# Patient Record
Sex: FEMALE | Race: WHITE | NOT HISPANIC OR LATINO | URBAN - METROPOLITAN AREA
[De-identification: names, ages, dates, MRNs, and addresses within clinical notes are randomized per-mention and may not be internally consistent; named-entity substitution may affect disease eponyms.]

---

## 2024-09-07 ENCOUNTER — APPOINTMENT (OUTPATIENT)
Dept: RADIOLOGY | Facility: HOSPITAL | Age: 84
End: 2024-09-07
Payer: MEDICARE

## 2024-09-07 ENCOUNTER — HOSPITAL ENCOUNTER (OUTPATIENT)
Facility: HOSPITAL | Age: 84
Setting detail: OBSERVATION
End: 2024-09-07
Attending: STUDENT IN AN ORGANIZED HEALTH CARE EDUCATION/TRAINING PROGRAM | Admitting: STUDENT IN AN ORGANIZED HEALTH CARE EDUCATION/TRAINING PROGRAM
Payer: MEDICARE

## 2024-09-07 DIAGNOSIS — K68.3 RETROPERITONEAL HEMATOMA: ICD-10-CM

## 2024-09-07 DIAGNOSIS — K59.00 CONSTIPATION, UNSPECIFIED CONSTIPATION TYPE: ICD-10-CM

## 2024-09-07 DIAGNOSIS — S36.892A TRAUMATIC RETROPERITONEAL HEMATOMA, INITIAL ENCOUNTER: ICD-10-CM
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
## 2024-09-07 DIAGNOSIS — E11.69 TYPE 2 DIABETES MELLITUS WITH OTHER SPECIFIED COMPLICATION, WITHOUT LONG-TERM CURRENT USE OF INSULIN (MULTI): ICD-10-CM

## 2024-09-07 LAB
ABO GROUP (TYPE) IN BLOOD: NORMAL
ALBUMIN SERPL BCP-MCNC: 4.4 G/DL (ref 3.4–5)
ALP SERPL-CCNC: 70 U/L (ref 33–136)
ALT SERPL W P-5'-P-CCNC: 30 U/L (ref 7–45)
ANION GAP SERPL CALC-SCNC: 16 MMOL/L (ref 10–20)
ANTIBODY SCREEN: NORMAL
APPEARANCE UR: CLEAR
APTT PPP: 26 SECONDS (ref 27–38)
AST SERPL W P-5'-P-CCNC: 33 U/L (ref 9–39)
BASOPHILS # BLD AUTO: 0.03 X10*3/UL (ref 0–0.1)
BASOPHILS NFR BLD AUTO: 0.3 %
BILIRUB SERPL-MCNC: 0.5 MG/DL (ref 0–1.2)
BILIRUB UR STRIP.AUTO-MCNC: NEGATIVE MG/DL
BUN SERPL-MCNC: 34 MG/DL (ref 6–23)
CALCIUM SERPL-MCNC: 9.2 MG/DL (ref 8.6–10.3)
CHLORIDE SERPL-SCNC: 102 MMOL/L (ref 98–107)
CO2 SERPL-SCNC: 25 MMOL/L (ref 21–32)
COLOR UR: ABNORMAL
CREAT SERPL-MCNC: 1.31 MG/DL (ref 0.5–1.05)
EGFRCR SERPLBLD CKD-EPI 2021: 40 ML/MIN/1.73M*2
EOSINOPHIL # BLD AUTO: 0.07 X10*3/UL (ref 0–0.4)
EOSINOPHIL NFR BLD AUTO: 0.7 %
ERYTHROCYTE [DISTWIDTH] IN BLOOD BY AUTOMATED COUNT: 13 % (ref 11.5–14.5)
GLUCOSE BLD MANUAL STRIP-MCNC: 174 MG/DL (ref 74–99)
GLUCOSE SERPL-MCNC: 154 MG/DL (ref 74–99)
GLUCOSE UR STRIP.AUTO-MCNC: ABNORMAL MG/DL
HCT VFR BLD AUTO: 43 % (ref 36–46)
HGB BLD-MCNC: 13.9 G/DL (ref 12–16)
IMM GRANULOCYTES # BLD AUTO: 0.04 X10*3/UL (ref 0–0.5)
IMM GRANULOCYTES NFR BLD AUTO: 0.4 % (ref 0–0.9)
INR PPP: 1 (ref 0.9–1.1)
KETONES UR STRIP.AUTO-MCNC: NEGATIVE MG/DL
LEUKOCYTE ESTERASE UR QL STRIP.AUTO: NEGATIVE
LYMPHOCYTES # BLD AUTO: 1.02 X10*3/UL (ref 0.8–3)
LYMPHOCYTES NFR BLD AUTO: 10.9 %
MCH RBC QN AUTO: 30 PG (ref 26–34)
MCHC RBC AUTO-ENTMCNC: 32.3 G/DL (ref 32–36)
MCV RBC AUTO: 93 FL (ref 80–100)
MONOCYTES # BLD AUTO: 0.72 X10*3/UL (ref 0.05–0.8)
MONOCYTES NFR BLD AUTO: 7.7 %
NEUTROPHILS # BLD AUTO: 7.49 X10*3/UL (ref 1.6–5.5)
NEUTROPHILS NFR BLD AUTO: 80 %
NITRITE UR QL STRIP.AUTO: NEGATIVE
NRBC BLD-RTO: 0 /100 WBCS (ref 0–0)
PH UR STRIP.AUTO: 6 [PH]
PLATELET # BLD AUTO: 188 X10*3/UL (ref 150–450)
POTASSIUM SERPL-SCNC: 4.2 MMOL/L (ref 3.5–5.3)
PROT SERPL-MCNC: 7.1 G/DL (ref 6.4–8.2)
PROT UR STRIP.AUTO-MCNC: ABNORMAL MG/DL
PROTHROMBIN TIME: 10.7 SECONDS (ref 9.8–12.8)
RBC # BLD AUTO: 4.64 X10*6/UL (ref 4–5.2)
RBC # UR STRIP.AUTO: NEGATIVE /UL
RBC #/AREA URNS AUTO: NORMAL /HPF
RH FACTOR (ANTIGEN D): NORMAL
SODIUM SERPL-SCNC: 139 MMOL/L (ref 136–145)
SP GR UR STRIP.AUTO: 1.01
UROBILINOGEN UR STRIP.AUTO-MCNC: NORMAL MG/DL
WBC # BLD AUTO: 9.4 X10*3/UL (ref 4.4–11.3)
WBC #/AREA URNS AUTO: NORMAL /HPF

## 2024-09-07 PROCEDURE — G0378 HOSPITAL OBSERVATION PER HR: HCPCS

## 2024-09-07 PROCEDURE — 74177 CT ABD & PELVIS W/CONTRAST: CPT

## 2024-09-07 PROCEDURE — 85025 COMPLETE CBC W/AUTO DIFF WBC: CPT | Performed by: PHYSICIAN ASSISTANT

## 2024-09-07 PROCEDURE — 2550000001 HC RX 255 CONTRASTS: Performed by: PHYSICIAN ASSISTANT

## 2024-09-07 PROCEDURE — 90471 IMMUNIZATION ADMIN: CPT | Performed by: PHYSICIAN ASSISTANT

## 2024-09-07 PROCEDURE — 72131 CT LUMBAR SPINE W/O DYE: CPT

## 2024-09-07 PROCEDURE — 94760 N-INVAS EAR/PLS OXIMETRY 1: CPT

## 2024-09-07 PROCEDURE — 86900 BLOOD TYPING SEROLOGIC ABO: CPT | Performed by: STUDENT IN AN ORGANIZED HEALTH CARE EDUCATION/TRAINING PROGRAM

## 2024-09-07 PROCEDURE — 70486 CT MAXILLOFACIAL W/O DYE: CPT

## 2024-09-07 PROCEDURE — 85730 THROMBOPLASTIN TIME PARTIAL: CPT | Performed by: STUDENT IN AN ORGANIZED HEALTH CARE EDUCATION/TRAINING PROGRAM

## 2024-09-07 PROCEDURE — 76377 3D RENDER W/INTRP POSTPROCES: CPT

## 2024-09-07 PROCEDURE — 72125 CT NECK SPINE W/O DYE: CPT | Performed by: RADIOLOGY

## 2024-09-07 PROCEDURE — 82947 ASSAY GLUCOSE BLOOD QUANT: CPT

## 2024-09-07 PROCEDURE — 36415 COLL VENOUS BLD VENIPUNCTURE: CPT | Performed by: STUDENT IN AN ORGANIZED HEALTH CARE EDUCATION/TRAINING PROGRAM

## 2024-09-07 PROCEDURE — 73130 X-RAY EXAM OF HAND: CPT | Mod: RIGHT SIDE | Performed by: RADIOLOGY

## 2024-09-07 PROCEDURE — 81001 URINALYSIS AUTO W/SCOPE: CPT | Performed by: PHYSICIAN ASSISTANT

## 2024-09-07 PROCEDURE — 76377 3D RENDER W/INTRP POSTPROCES: CPT | Performed by: RADIOLOGY

## 2024-09-07 PROCEDURE — 2500000004 HC RX 250 GENERAL PHARMACY W/ HCPCS (ALT 636 FOR OP/ED): Performed by: PHYSICIAN ASSISTANT

## 2024-09-07 PROCEDURE — 90715 TDAP VACCINE 7 YRS/> IM: CPT | Performed by: PHYSICIAN ASSISTANT

## 2024-09-07 PROCEDURE — 73130 X-RAY EXAM OF HAND: CPT | Mod: RT

## 2024-09-07 PROCEDURE — 80053 COMPREHEN METABOLIC PANEL: CPT | Performed by: PHYSICIAN ASSISTANT

## 2024-09-07 PROCEDURE — 70450 CT HEAD/BRAIN W/O DYE: CPT

## 2024-09-07 PROCEDURE — 72125 CT NECK SPINE W/O DYE: CPT

## 2024-09-07 PROCEDURE — 70450 CT HEAD/BRAIN W/O DYE: CPT | Performed by: RADIOLOGY

## 2024-09-07 PROCEDURE — 73590 X-RAY EXAM OF LOWER LEG: CPT | Mod: RT

## 2024-09-07 PROCEDURE — 99223 1ST HOSP IP/OBS HIGH 75: CPT | Performed by: STUDENT IN AN ORGANIZED HEALTH CARE EDUCATION/TRAINING PROGRAM

## 2024-09-07 PROCEDURE — 85610 PROTHROMBIN TIME: CPT | Performed by: STUDENT IN AN ORGANIZED HEALTH CARE EDUCATION/TRAINING PROGRAM

## 2024-09-07 PROCEDURE — 99285 EMERGENCY DEPT VISIT HI MDM: CPT | Mod: 25

## 2024-09-07 PROCEDURE — 2500000001 HC RX 250 WO HCPCS SELF ADMINISTERED DRUGS (ALT 637 FOR MEDICARE OP): Performed by: STUDENT IN AN ORGANIZED HEALTH CARE EDUCATION/TRAINING PROGRAM

## 2024-09-07 PROCEDURE — 86901 BLOOD TYPING SEROLOGIC RH(D): CPT | Performed by: STUDENT IN AN ORGANIZED HEALTH CARE EDUCATION/TRAINING PROGRAM

## 2024-09-07 PROCEDURE — 70486 CT MAXILLOFACIAL W/O DYE: CPT | Performed by: RADIOLOGY

## 2024-09-07 PROCEDURE — 73590 X-RAY EXAM OF LOWER LEG: CPT | Mod: RIGHT SIDE | Performed by: RADIOLOGY

## 2024-09-07 PROCEDURE — 74177 CT ABD & PELVIS W/CONTRAST: CPT | Performed by: RADIOLOGY

## 2024-09-07 PROCEDURE — 72131 CT LUMBAR SPINE W/O DYE: CPT | Performed by: RADIOLOGY

## 2024-09-07 PROCEDURE — 36415 COLL VENOUS BLD VENIPUNCTURE: CPT | Performed by: PHYSICIAN ASSISTANT

## 2024-09-07 PROCEDURE — 12001 RPR S/N/AX/GEN/TRNK 2.5CM/<: CPT

## 2024-09-07 RX ORDER — DEXTROSE 50 % IN WATER (D50W) INTRAVENOUS SYRINGE
12.5
Status: ACTIVE | OUTPATIENT
Start: 2024-09-07

## 2024-09-07 RX ORDER — OXYCODONE HYDROCHLORIDE 5 MG/1
2.5 TABLET ORAL EVERY 4 HOURS PRN
Status: ACTIVE | OUTPATIENT
Start: 2024-09-07

## 2024-09-07 RX ORDER — DEXTROSE 50 % IN WATER (D50W) INTRAVENOUS SYRINGE
25
Status: ACTIVE | OUTPATIENT
Start: 2024-09-07

## 2024-09-07 RX ORDER — LIDOCAINE HYDROCHLORIDE 10 MG/ML
10 INJECTION INFILTRATION; PERINEURAL ONCE
Status: DISPENSED | OUTPATIENT
Start: 2024-09-07

## 2024-09-07 RX ORDER — AMLODIPINE BESYLATE 5 MG/1
5 TABLET ORAL DAILY
Status: DISPENSED | OUTPATIENT
Start: 2024-09-07

## 2024-09-07 RX ORDER — ACETAMINOPHEN 325 MG/1
975 TABLET ORAL EVERY 8 HOURS
Status: DISPENSED | OUTPATIENT
Start: 2024-09-07

## 2024-09-07 RX ORDER — OXYCODONE HYDROCHLORIDE 5 MG/1
5 TABLET ORAL EVERY 4 HOURS PRN
Status: ACTIVE | OUTPATIENT
Start: 2024-09-07

## 2024-09-07 RX ORDER — AMLODIPINE BESYLATE 2.5 MG/1
2.5 TABLET ORAL
Status: ON HOLD | COMMUNITY
Start: 2024-03-13

## 2024-09-07 RX ORDER — FAMOTIDINE 20 MG/1
10 TABLET, FILM COATED ORAL 2 TIMES DAILY PRN
Status: ACTIVE | OUTPATIENT
Start: 2024-09-07

## 2024-09-07 RX ORDER — PRAVASTATIN SODIUM 40 MG/1
20 TABLET ORAL NIGHTLY
Status: DISPENSED | OUTPATIENT
Start: 2024-09-07

## 2024-09-07 RX ORDER — FERROUS SULFATE 325(65) MG
325 TABLET ORAL NIGHTLY
Status: ON HOLD | COMMUNITY

## 2024-09-07 RX ORDER — METFORMIN HYDROCHLORIDE 500 MG/1
500 TABLET ORAL
Status: ON HOLD | COMMUNITY

## 2024-09-07 RX ORDER — INSULIN LISPRO 100 [IU]/ML
0-10 INJECTION, SOLUTION INTRAVENOUS; SUBCUTANEOUS
Status: DISPENSED | OUTPATIENT
Start: 2024-09-08

## 2024-09-07 RX ORDER — LABETALOL HYDROCHLORIDE 5 MG/ML
20 INJECTION, SOLUTION INTRAVENOUS EVERY 4 HOURS PRN
Status: DISCONTINUED | OUTPATIENT
Start: 2024-09-07 | End: 2024-09-07

## 2024-09-07 RX ORDER — AMOXICILLIN 250 MG
2 CAPSULE ORAL NIGHTLY PRN
Status: ACTIVE | OUTPATIENT
Start: 2024-09-07

## 2024-09-07 RX ORDER — LABETALOL HYDROCHLORIDE 5 MG/ML
10 INJECTION, SOLUTION INTRAVENOUS EVERY 4 HOURS PRN
Status: ACTIVE | OUTPATIENT
Start: 2024-09-07

## 2024-09-07 RX ORDER — PRAVASTATIN SODIUM 20 MG/1
1 TABLET ORAL DAILY
Status: ON HOLD | COMMUNITY
Start: 2017-06-05

## 2024-09-07 RX ORDER — CALCITRIOL 0.25 UG/1
0.25 CAPSULE ORAL DAILY
Status: ON HOLD | COMMUNITY

## 2024-09-07 RX ORDER — ACETAMINOPHEN 500 MG
5 TABLET ORAL NIGHTLY PRN
Status: ACTIVE | OUTPATIENT
Start: 2024-09-07

## 2024-09-07 RX ORDER — ONDANSETRON HYDROCHLORIDE 2 MG/ML
4 INJECTION, SOLUTION INTRAVENOUS EVERY 6 HOURS PRN
Status: ACTIVE | OUTPATIENT
Start: 2024-09-07

## 2024-09-07 SDOH — SOCIAL STABILITY: SOCIAL INSECURITY: HAS ANYONE EVER THREATENED TO HURT YOUR FAMILY OR YOUR PETS?: NO

## 2024-09-07 SDOH — SOCIAL STABILITY: SOCIAL INSECURITY: DO YOU FEEL ANYONE HAS EXPLOITED OR TAKEN ADVANTAGE OF YOU FINANCIALLY OR OF YOUR PERSONAL PROPERTY?: NO

## 2024-09-07 SDOH — SOCIAL STABILITY: SOCIAL INSECURITY: DOES ANYONE TRY TO KEEP YOU FROM HAVING/CONTACTING OTHER FRIENDS OR DOING THINGS OUTSIDE YOUR HOME?: NO

## 2024-09-07 SDOH — SOCIAL STABILITY: SOCIAL INSECURITY: DO YOU FEEL UNSAFE GOING BACK TO THE PLACE WHERE YOU ARE LIVING?: NO

## 2024-09-07 SDOH — SOCIAL STABILITY: SOCIAL INSECURITY: ARE YOU OR HAVE YOU BEEN THREATENED OR ABUSED PHYSICALLY, EMOTIONALLY, OR SEXUALLY BY ANYONE?: NO

## 2024-09-07 SDOH — SOCIAL STABILITY: SOCIAL INSECURITY: HAVE YOU HAD THOUGHTS OF HARMING ANYONE ELSE?: NO

## 2024-09-07 SDOH — SOCIAL STABILITY: SOCIAL INSECURITY: WERE YOU ABLE TO COMPLETE ALL THE BEHAVIORAL HEALTH SCREENINGS?: YES

## 2024-09-07 SDOH — SOCIAL STABILITY: SOCIAL INSECURITY: ARE THERE ANY APPARENT SIGNS OF INJURIES/BEHAVIORS THAT COULD BE RELATED TO ABUSE/NEGLECT?: NO

## 2024-09-07 SDOH — SOCIAL STABILITY: SOCIAL INSECURITY: HAVE YOU HAD ANY THOUGHTS OF HARMING ANYONE ELSE?: NO

## 2024-09-07 SDOH — SOCIAL STABILITY: SOCIAL INSECURITY: ABUSE: ADULT

## 2024-09-07 ASSESSMENT — COGNITIVE AND FUNCTIONAL STATUS - GENERAL
DAILY ACTIVITIY SCORE: 22
DRESSING REGULAR LOWER BODY CLOTHING: A LITTLE
MOBILITY SCORE: 22
WALKING IN HOSPITAL ROOM: A LITTLE
CLIMB 3 TO 5 STEPS WITH RAILING: A LITTLE
DRESSING REGULAR UPPER BODY CLOTHING: A LITTLE
PATIENT BASELINE BEDBOUND: NO

## 2024-09-07 ASSESSMENT — ACTIVITIES OF DAILY LIVING (ADL)
WALKS IN HOME: INDEPENDENT
TOILETING: INDEPENDENT
FEEDING YOURSELF: INDEPENDENT
BATHING: INDEPENDENT
HEARING - LEFT EAR: FUNCTIONAL
HEARING - RIGHT EAR: FUNCTIONAL
PATIENT'S MEMORY ADEQUATE TO SAFELY COMPLETE DAILY ACTIVITIES?: YES
GROOMING: INDEPENDENT
DRESSING YOURSELF: NEEDS ASSISTANCE
JUDGMENT_ADEQUATE_SAFELY_COMPLETE_DAILY_ACTIVITIES: YES
LACK_OF_TRANSPORTATION: NO
ADEQUATE_TO_COMPLETE_ADL: YES

## 2024-09-07 ASSESSMENT — LIFESTYLE VARIABLES
HOW OFTEN DO YOU HAVE A DRINK CONTAINING ALCOHOL: MONTHLY OR LESS
HOW MANY STANDARD DRINKS CONTAINING ALCOHOL DO YOU HAVE ON A TYPICAL DAY: 1 OR 2
TOTAL SCORE: 0
EVER FELT BAD OR GUILTY ABOUT YOUR DRINKING: NO
AUDIT-C TOTAL SCORE: 1
AUDIT-C TOTAL SCORE: 1
HAVE PEOPLE ANNOYED YOU BY CRITICIZING YOUR DRINKING: NO
EVER HAD A DRINK FIRST THING IN THE MORNING TO STEADY YOUR NERVES TO GET RID OF A HANGOVER: NO
HAVE YOU EVER FELT YOU SHOULD CUT DOWN ON YOUR DRINKING: NO
SKIP TO QUESTIONS 9-10: 1
HOW OFTEN DO YOU HAVE 6 OR MORE DRINKS ON ONE OCCASION: NEVER

## 2024-09-07 ASSESSMENT — PAIN DESCRIPTION - PAIN TYPE: TYPE: ACUTE PAIN

## 2024-09-07 ASSESSMENT — PAIN - FUNCTIONAL ASSESSMENT
PAIN_FUNCTIONAL_ASSESSMENT: 0-10

## 2024-09-07 ASSESSMENT — PAIN DESCRIPTION - LOCATION: LOCATION: JAW

## 2024-09-07 ASSESSMENT — PATIENT HEALTH QUESTIONNAIRE - PHQ9
SUM OF ALL RESPONSES TO PHQ9 QUESTIONS 1 & 2: 0
1. LITTLE INTEREST OR PLEASURE IN DOING THINGS: NOT AT ALL
2. FEELING DOWN, DEPRESSED OR HOPELESS: NOT AT ALL

## 2024-09-07 ASSESSMENT — PAIN DESCRIPTION - DESCRIPTORS: DESCRIPTORS: SORE

## 2024-09-07 ASSESSMENT — PAIN SCALES - GENERAL
PAINLEVEL_OUTOF10: 5 - MODERATE PAIN
PAINLEVEL_OUTOF10: 5 - MODERATE PAIN

## 2024-09-07 ASSESSMENT — PAIN DESCRIPTION - ORIENTATION: ORIENTATION: RIGHT

## 2024-09-07 NOTE — ED PROVIDER NOTES
HPI   Chief Complaint   Patient presents with    Fall       History of present illness:  84-year-old female presents to the emergency room for complaints of fall.  Patient states that she was attempting go up some stairs when she lost her balance after missing a step causing a fall onto her right side.  She states that she struck the right side of her face her head and her neck as well as her right lower back.  She states that she also suffered a small injury to her right hand across the palm.  She states that she does not take any blood thinners and takes medications for hypertension and hypothyroidism and diabetes.  She states that she has no other symptoms at this time denies any pain or injury to her legs.  She denies any loss conscious nausea or vomiting or vertigo.      Social history: Negative for alcohol and drug use.    Review of systems:   Gen.: No weight loss, fatigue, anorexia, insomnia, fever.   Eyes: No vision loss, double vision, drainage, eye pain.   ENT: No pharyngitis, dry mouth.   Cardiac: No chest pain, palpitations, syncope, near syncope.   Pulmonary: No shortness of breath, cough, hemoptysis.   Heme/lymph: No swollen glands, fever, bleeding.   GI: No abdominal pain, change in bowel habits, melena, hematemesis, hematochezia, nausea, vomiting, diarrhea.   : No discharge, dysuria, frequency, urgency, hematuria.   Musculoskeletal: No limb pain, joint pain, joint swelling.   Skin: No rashes.   Review of systems is otherwise negative unless stated above or in history of present illness.        Physical exam:  General: Vitals noted, no distress. Afebrile.   EENT: Posterior pharynx unremarkable, there are some bruising present across right side of face but the patient has no pain with movement of the cervical spine no pain to palpation across the scalp or any crepitus appreciated in particular.   Back: There is no pain to palpation over the thoracic spine, there is no direct pain to palpation across  the lumbar spine there is no step-off appreciated but the patient does complain of some pain to palpation across the paraspinal muscles in the lower lumbar region  Cardiac: Regular, rate, rhythm, no murmur.   Pulmonary: Lungs clear bilaterally with good aeration. No adventitious breath sounds.   Abdomen: Soft, nonsurgical. Nontender. No peritoneal signs. Normoactive bowel sounds.   Extremities: No peripheral edema.  2 cm elliptical laceration present just at the base of the fifth digit on the right hand, good cap refill and sensation all fingers at this time, the pain to palpation across the hand, no pain palpation across the legs or across the arms  Skin: No rash.   Neuro: No focal neurologic deficits        Medical decision making:   Testing: CT scan of head cervical spine and facial bones without contrast showed no acute findings, right hand x-rays are also unremarkable for any fracture, CT scan of the lumbar spine shows concerns for possible retroperitoneal hematoma near the right kidney, CT scan of the abdomen pelvis with contrast redemonstrates this but does not show if it is arterial or venous in origin.  There is concern for active bleeding at this time.  All imaging was interpreted by radiology  Treatment: Tetanus shot updated, skin glue used to close the wound on the right hand  Reevaluation: I spoke with Dr. Gusman of general surgery reviewed the case and explained to me that the patient should be admitted for further observation and possible intervention as needed.  I spoke with Dr. Cruz of interventional radiology who also confirmed that he was available for consult if need be by the hospitalist team this weekend.  Plan: 84-year-old female presents to the emergency room for complaints of fall.  Patient states that she was attempting go up some stairs when she lost her balance after missing a step causing a fall onto her right side.  She states that she struck the right side of her face her head and her  neck as well as her right lower back.  She states that she also suffered a small injury to her right hand across the palm.  She states that she does not take any blood thinners and takes medications for hypertension and hypothyroidism and diabetes.  She states that she has no other symptoms at this time denies any pain or injury to her legs.  She denies any loss conscious nausea or vomiting or vertigo. Extremities: No peripheral edema.  2 cm elliptical laceration present just at the base of the fifth digit on the right hand, good cap refill and sensation all fingers at this time, the pain to palpation across the hand, no pain palpation across the legs or across the arms. EENT: Posterior pharynx unremarkable, there are some bruising present across right side of face but the patient has no pain with movement of the cervical spine no pain to palpation across the scalp or any crepitus appreciated in particular.   Back: There is no pain to palpation over the thoracic spine, there is no direct pain to palpation across the lumbar spine there is no step-off appreciated but the patient does complain of some pain to palpation across the paraspinal muscles in the lower lumbar region.  I explained to the patient the test results at this time and she was admitted to the care of the hospitalist team for further evaluation.  Impression:   1.  Peritoneal hematoma  2.  Head injury  3. fall          History provided by:  Patient   used: No            Patient History   Past Medical History:   Diagnosis Date    Personal history of other venous thrombosis and embolism     H/O blood clots     Past Surgical History:   Procedure Laterality Date    EYE SURGERY  03/03/2016    Eye Surgery    KNEE SURGERY  03/03/2016    Knee Surgery    OTHER SURGICAL HISTORY  03/03/2016    Biopsy Skin     No family history on file.  Social History     Tobacco Use    Smoking status: Not on file    Smokeless tobacco: Not on file   Substance  Use Topics    Alcohol use: Not on file    Drug use: Not on file       Physical Exam   ED Triage Vitals [09/07/24 1352]   Temperature Heart Rate Respirations BP   36.6 °C (97.9 °F) 97 14 (!) 182/110      Pulse Ox Temp Source Heart Rate Source Patient Position   98 % Temporal Monitor Sitting      BP Location FiO2 (%)     Left arm --       Physical Exam      ED Course & MDM   ED Course as of 09/07/24 1852   Sat Sep 07, 2024   1810 Xr leg 1. No fracture or dislocation.  2. Degenerative changes,    [WL]   1811 Xr hand 1. No fracture or dislocation.  2. Degenerative changes   [WL]   1811 CT head wo IV contrast  No acute intracranial hemorrhage or mass-effect. [WL]   1812 CT cervical spine wo IV contrast  No cervical vertebral compression deformity or acute displaced  fracture. Grade 1 anterolisthesis at C3-4. Multilevel  productive/degenerative changes.   [WL]   1812 Ct face No acute displaced facial bone fracture visualized. Mild soft tissue  swelling inferior to the right mandible.   [WL]   1813 CT lumbar spine wo IV contrast  No lumbar vertebral compression deformity or acute displaced  fracture. Multilevel degenerative changes with scoliosis and  spondylolisthesis.      Partially imaged right retroperitoneal hematoma displacing the right  kidney. Consider more complete evaluation using CT abdomen and pelvis.   [WL]   1813 This is a 84-year-old female present with chief complaint of fall.  Fall reportedly mechanical.  Imaging performed.  Demonstrate recurrent looks like concerning hematoma of the right kidney. .  Plan will be to consult surgery and admission. [WL]   1821 Did reach out to Dr. Gusman for general surgery.  Stated would be okay to admit here to medicine as long as if need be IR on as it is more so medical management then surgical intervention. [WL]   1829 Ct abd shows 1. Right retroperitoneal hematoma centered posteromedial to the right  kidney. There is lateral displacement of the right kidney. There is  a  small focus of active extravasation within the superior aspect of the  hematoma. The source vessel for the focus of active extravasation is  unclear, possibly from a lumbar or renal vessel. It is also unclear  if this is arterial or venous in origin.  2. The source of the hematoma is unclear, however it appears to be  distinct from the renal parenchyma and there is no evidence of renal  parenchymal injury. Hematoma may be arising from a retroperitoneal  vessel or possibly from the right psoas muscle with extension beyond  the confines of the muscle. However, there is no abnormal expansion  of the right psoas muscle more inferiorly.  3. Please see additional findings and discussion as above.   [WL]   1850 Slight laceration to right hand.  Full range of motion in the right hand neuro vastly intact distally.  No pulsatile bleeding.  Tender to palpation to right flank no bruising or decreased range of motion. [WL]   1850 IR consulted and okay to admit here to medicine.  Medicine agrees on admission. [WL]      ED Course User Index  [WL] Chai Tamez DO         Diagnoses as of 09/07/24 1852   Fall, initial encounter   Traumatic retroperitoneal hematoma, initial encounter                 No data recorded     Sumeet Coma Scale Score: 15 (09/07/24 1357 : Karine Arita RN)                           Medical Decision Making      Procedure  Procedures     Geovany Peace PA-C  09/07/24 8598

## 2024-09-07 NOTE — ED TRIAGE NOTES
Patient was walking on sidewalk, tripped and fell onto her right side. Stated she scraped her head on cement of sidewalk. No LOC, no blood thinners. Laceration right hand, no active bleeding

## 2024-09-07 NOTE — H&P
[facility]  Department of Hospital Medicine    HISTORY AND PHYSICAL    Chief Complaint   Patient presents with    Fall        History Of Present Illness  Claudette Whitelaw is a 84 y.o. female with type 2 diabetes, hyperlipidemia, hypertension, CKD 3A.  She presented to the [facility] ED after she tripped and fell on a sidewalk, sustaining a laceration to her right hand.  Also had back pain.  Workup in the ED revealed a right sided retroperitoneal hematoma with active extravasation.  Not on blood thinners.  Case was discussed with on-call trauma and IR, who recommended observation but no interventions at this time.    Right hand laceration glued in the ED.  Endorses taking Tylenol and ibuprofen for pain.  Per outpatient notes, is prescribed 2.5 mg of amlodipine but not on the fill history.  Blood pressure was elevated to 180 systolic.  Reports that she usually better controlled at home.  Having pain in the right lower back.     Past Medical History  She has a past medical history of Personal history of other venous thrombosis and embolism.    Surgical History  She has a past surgical history that includes Knee surgery (03/03/2016); Eye surgery (03/03/2016); and Other surgical history (03/03/2016).     Social History  She has no history on file for tobacco use, alcohol use, and drug use.    Family History  No family history on file.     Allergies  Clindamycin    Review of systems  11-point ROS was performed and is negative except as noted in the HPI.     Physical Exam   CON: awake, alert, moderate distress;   EYES: conjunctiva wnl; PERRL; EOMI  ENMT: hearing intact; MMM;   NECK: symmetric; thyroid and cervical nodes wnl;   CV: S1 S2 - RRR with no m/g/r; no lower extremity edema; normal JVP; symmetric pulses  RESP: normal work of breathing; lungs CTAB;   GI: abdomen nontender; no organomegaly;   SKIN: no lesions or rashes; no induration;   MSK: ROM wnl; digits wnl; right hand  "superficial laceration at base of right thumb  NEURO: language and speech wnl; sensation and motor function grossly intact   PSYCH: oriented to situation; affect anxious;     Last Recorded Vitals  Blood pressure (!) 184/96, pulse 95, temperature 36.6 °C (97.9 °F), temperature source Temporal, resp. rate 14, height 1.473 m (4' 10\"), weight 59 kg (130 lb), SpO2 95%.    Relevant Results  Lab Results   Component Value Date    WBC 9.4 09/07/2024    HGB 13.9 09/07/2024    HCT 43.0 09/07/2024    MCV 93 09/07/2024     09/07/2024      Lab Results   Component Value Date    GLUCOSE 154 (H) 09/07/2024    CALCIUM 9.2 09/07/2024     09/07/2024    K 4.2 09/07/2024    CO2 25 09/07/2024     09/07/2024    BUN 34 (H) 09/07/2024    CREATININE 1.31 (H) 09/07/2024        Scheduled medications:  acetaminophen, 975 mg, oral, q8h  [START ON 9/8/2024] insulin lispro, 0-10 Units, subcutaneous, TID  lidocaine, 10 mL, subcutaneous, Once      Continuous medications:     PRN medications:  PRN medications: dextrose, dextrose, famotidine, glucagon, glucagon, labetaloL, melatonin, ondansetron, oxyCODONE, oxyCODONE, oxygen, sennosides-docusate sodium                Assessment/Plan   Principal Problem:    Retroperitoneal hematoma    Claudette Whitelaw is a 84 y.o. female with type 2 diabetes, hyperlipidemia, hypertension, CKD 3A.  She presented to the [facility] ED after she tripped and fell on a sidewalk, sustaining a laceration to her right hand.  Also had back pain.  Workup in the ED revealed a right sided retroperitoneal hematoma with active extravasation.  Not on blood thinners.  Case was discussed with on-call trauma and IR, who recommended observation but no interventions at this time.    Right retroperitoneal hematoma with active extravasation:  > Small hematoma on imaging, unclear if arterial or venous source.  Appears to be well-contained.  Anticipate it will tamponade on its own.  -Pain control, avoiding NSAIDs  -Avoid " pharmacologic DVT prophylaxis  -Trend CBC  -Blood pressure control  -Per IR, no need for follow-up imaging unless she decompensates    Acute on chronic hypertension:  > Likely worsened in the setting of pain  -Resume amlodipine  -Labetalol as needed     DM2:  -trend fingerstick glucose; inpatient goal 140-180  -holding oral hypoglycemics  -sliding scale insulin  -hypoglycemia protocol    Dispo: observation, likely 1 day          Yuriy Costa MD    Patient Name:  Claudette Whitelaw   MRN:   [id number]   [facility]:  [facility]

## 2024-09-08 VITALS
OXYGEN SATURATION: 94 % | SYSTOLIC BLOOD PRESSURE: 129 MMHG | BODY MASS INDEX: 27.3 KG/M2 | HEART RATE: 94 BPM | TEMPERATURE: 97.5 F | WEIGHT: 130.07 LBS | DIASTOLIC BLOOD PRESSURE: 78 MMHG | RESPIRATION RATE: 17 BRPM | HEIGHT: 58 IN

## 2024-09-08 LAB
ANION GAP SERPL CALC-SCNC: 13 MMOL/L (ref 10–20)
BASOPHILS # BLD AUTO: 0.04 X10*3/UL (ref 0–0.1)
BASOPHILS NFR BLD AUTO: 0.5 %
BUN SERPL-MCNC: 35 MG/DL (ref 6–23)
CALCIUM SERPL-MCNC: 8.9 MG/DL (ref 8.6–10.3)
CHLORIDE SERPL-SCNC: 102 MMOL/L (ref 98–107)
CO2 SERPL-SCNC: 24 MMOL/L (ref 21–32)
CREAT SERPL-MCNC: 1.42 MG/DL (ref 0.5–1.05)
EGFRCR SERPLBLD CKD-EPI 2021: 36 ML/MIN/1.73M*2
EOSINOPHIL # BLD AUTO: 0.13 X10*3/UL (ref 0–0.4)
EOSINOPHIL NFR BLD AUTO: 1.6 %
ERYTHROCYTE [DISTWIDTH] IN BLOOD BY AUTOMATED COUNT: 13.1 % (ref 11.5–14.5)
GLUCOSE BLD MANUAL STRIP-MCNC: 108 MG/DL (ref 74–99)
GLUCOSE BLD MANUAL STRIP-MCNC: 109 MG/DL (ref 74–99)
GLUCOSE BLD MANUAL STRIP-MCNC: 168 MG/DL (ref 74–99)
GLUCOSE BLD MANUAL STRIP-MCNC: 96 MG/DL (ref 74–99)
GLUCOSE SERPL-MCNC: 110 MG/DL (ref 74–99)
HCT VFR BLD AUTO: 36.2 % (ref 36–46)
HCT VFR BLD AUTO: 38.1 % (ref 36–46)
HGB BLD-MCNC: 11.5 G/DL (ref 12–16)
HGB BLD-MCNC: 12.1 G/DL (ref 12–16)
IMM GRANULOCYTES # BLD AUTO: 0.05 X10*3/UL (ref 0–0.5)
IMM GRANULOCYTES NFR BLD AUTO: 0.6 % (ref 0–0.9)
LYMPHOCYTES # BLD AUTO: 1.29 X10*3/UL (ref 0.8–3)
LYMPHOCYTES NFR BLD AUTO: 15.4 %
MCH RBC QN AUTO: 30 PG (ref 26–34)
MCHC RBC AUTO-ENTMCNC: 31.8 G/DL (ref 32–36)
MCV RBC AUTO: 94 FL (ref 80–100)
MONOCYTES # BLD AUTO: 0.9 X10*3/UL (ref 0.05–0.8)
MONOCYTES NFR BLD AUTO: 10.7 %
NEUTROPHILS # BLD AUTO: 5.97 X10*3/UL (ref 1.6–5.5)
NEUTROPHILS NFR BLD AUTO: 71.2 %
NRBC BLD-RTO: 0 /100 WBCS (ref 0–0)
PLATELET # BLD AUTO: 195 X10*3/UL (ref 150–450)
POTASSIUM SERPL-SCNC: 4 MMOL/L (ref 3.5–5.3)
RBC # BLD AUTO: 4.04 X10*6/UL (ref 4–5.2)
SODIUM SERPL-SCNC: 135 MMOL/L (ref 136–145)
WBC # BLD AUTO: 8.4 X10*3/UL (ref 4.4–11.3)

## 2024-09-08 PROCEDURE — 36415 COLL VENOUS BLD VENIPUNCTURE: CPT | Performed by: INTERNAL MEDICINE

## 2024-09-08 PROCEDURE — 85025 COMPLETE CBC W/AUTO DIFF WBC: CPT | Performed by: INTERNAL MEDICINE

## 2024-09-08 PROCEDURE — 85014 HEMATOCRIT: CPT | Performed by: INTERNAL MEDICINE

## 2024-09-08 PROCEDURE — 97116 GAIT TRAINING THERAPY: CPT | Mod: GP

## 2024-09-08 PROCEDURE — 2500000005 HC RX 250 GENERAL PHARMACY W/O HCPCS: Performed by: STUDENT IN AN ORGANIZED HEALTH CARE EDUCATION/TRAINING PROGRAM

## 2024-09-08 PROCEDURE — 97161 PT EVAL LOW COMPLEX 20 MIN: CPT | Mod: GP

## 2024-09-08 PROCEDURE — 82947 ASSAY GLUCOSE BLOOD QUANT: CPT

## 2024-09-08 PROCEDURE — 94760 N-INVAS EAR/PLS OXIMETRY 1: CPT

## 2024-09-08 PROCEDURE — G0378 HOSPITAL OBSERVATION PER HR: HCPCS

## 2024-09-08 PROCEDURE — 80048 BASIC METABOLIC PNL TOTAL CA: CPT | Performed by: INTERNAL MEDICINE

## 2024-09-08 PROCEDURE — 2500000001 HC RX 250 WO HCPCS SELF ADMINISTERED DRUGS (ALT 637 FOR MEDICARE OP): Performed by: STUDENT IN AN ORGANIZED HEALTH CARE EDUCATION/TRAINING PROGRAM

## 2024-09-08 PROCEDURE — 99232 SBSQ HOSP IP/OBS MODERATE 35: CPT | Performed by: INTERNAL MEDICINE

## 2024-09-08 PROCEDURE — 99222 1ST HOSP IP/OBS MODERATE 55: CPT | Performed by: SURGERY

## 2024-09-08 RX ORDER — CALCITRIOL 0.25 UG/1
0.25 CAPSULE ORAL DAILY
OUTPATIENT
Start: 2024-09-08

## 2024-09-08 ASSESSMENT — COGNITIVE AND FUNCTIONAL STATUS - GENERAL
WALKING IN HOSPITAL ROOM: A LITTLE
DAILY ACTIVITIY SCORE: 21
CLIMB 3 TO 5 STEPS WITH RAILING: A LITTLE
WALKING IN HOSPITAL ROOM: A LITTLE
MOBILITY SCORE: 22
WALKING IN HOSPITAL ROOM: A LITTLE
MOBILITY SCORE: 22
DRESSING REGULAR UPPER BODY CLOTHING: A LITTLE
EATING MEALS: A LITTLE
DRESSING REGULAR LOWER BODY CLOTHING: A LITTLE
DAILY ACTIVITIY SCORE: 22
DRESSING REGULAR LOWER BODY CLOTHING: A LITTLE
MOBILITY SCORE: 22
CLIMB 3 TO 5 STEPS WITH RAILING: A LITTLE
DRESSING REGULAR UPPER BODY CLOTHING: A LITTLE
CLIMB 3 TO 5 STEPS WITH RAILING: A LITTLE

## 2024-09-08 ASSESSMENT — PAIN SCALES - GENERAL
PAINLEVEL_OUTOF10: 2
PAINLEVEL_OUTOF10: 0 - NO PAIN
PAINLEVEL_OUTOF10: 0 - NO PAIN

## 2024-09-08 ASSESSMENT — ACTIVITIES OF DAILY LIVING (ADL): LACK_OF_TRANSPORTATION: NO

## 2024-09-08 ASSESSMENT — PAIN - FUNCTIONAL ASSESSMENT: PAIN_FUNCTIONAL_ASSESSMENT: 0-10

## 2024-09-08 NOTE — CARE PLAN
The patient's goals for the shift include  to find out if she can go home soon.    The clinical goals for the shift include stable vitals      Problem: Pain - Adult  Goal: Verbalizes/displays adequate comfort level or baseline comfort level  Reactivated     Problem: Safety - Adult  Goal: Free from fall injury  Reactivated     Problem: Fall/Injury  Goal: Not fall by end of shift  Reactivated     Problem: Pain  Goal: Turns in bed with improved pain control throughout the shift  Reactivated  Goal: Walks with improved pain control throughout the shift  Reactivated     Pt had uneventful shift.  Vitals stable, pain controlled with scheduled tylenol. Pt slept and remained safe.

## 2024-09-08 NOTE — CARE PLAN
The patient's goals for the shift include  no pain     The clinical goals for the shift include no pain

## 2024-09-08 NOTE — CONSULTS
Reason For Consult  Retroperitoneal hematoma    History Of Present Illness  Claudette Whitelaw is a 84 y.o. female presenting with retroperitoneal hematoma after a fall.  The patient states that she was.  Attempting to go up some steps when she missed a step and fell this was on brick.  She hit the side of her face mostly in her right lower back she feels that she may have done a lot of twisting and turning when she went down.  She also had a small soft tissue injury to her right hand.  She denies any loss of consciousness and she denies any pain at this time.  She has a little soreness over her right leg around the knee where there is a small hematoma.  She does not take any blood thinners.     Past Medical History  She has a past medical history of Personal history of other venous thrombosis and embolism.  Patient has a history of hypertension hypothyroidism and diabetes.    Surgical History  She has a past surgical history that includes Knee surgery (03/03/2016); Eye surgery (03/03/2016); and Other surgical history (03/03/2016).     Social History  She has no history on file for tobacco use, alcohol use, and drug use.  She does not smoke she does not drink alcohol  Family History  No family history on file.  Noncontributory  Allergies  Clindamycin    Review of Systems  10 point review is otherwise negative     Physical Exam  Head is normocephalic atraumatic eyes extraocular movements are intact intact the pupils are equal.  Neck is supple and nontender.  She has full range of motion.  Trachea is midline.  The upper extremities reveal some bruising and a Band-Aid over the lateral aspect of her right hand.  She is neurologically intact in both the upper and lower extremities.  Heart is regular rate and rhythm.  Lungs are clear bilaterally.  Abdomen is flat and soft and completely nontender.  The lower extremities reveal lots of chronic venous changes and also a bruise and hematoma over the lateral aspect of the  "right lower extremity around the knee.  This is soft however and not tense.     Last Recorded Vitals  Blood pressure 128/81, pulse 73, temperature 36.1 °C (97 °F), temperature source Temporal, resp. rate 18, height 1.473 m (4' 10\"), weight 59 kg (130 lb 1.1 oz), SpO2 96%.    Relevant Results  Hemoglobin is normal currently  Right tib-fib films are normal, x-ray of the right hand does not show any fracture, CT scan of the head does not show any acute injury, CT scan of the facial bones does not show any fracture, CT scan of the cervical spine does not show any acute injury just chronic changes, CT scan of the lumbar spine does not show any acute fracture just degenerative changes that are chronic.  CT scan of the abdomen and pelvis shows a right-sided retroperitoneal hematoma that is posterior medial to the right kidney.  There is a small amount of extravasation seen on this CAT scan.       Assessment/Plan     History of a fall just yesterday and a retroperitoneal hematoma.  The patient clinically is very stable agree with continued observation and monitoring of H&H.  Suspect that her H&H will drop given the size of the hematoma on the CT scan.  Equilibration may take a few days.  No need for additional imaging unless she clinically deteriorates or H&H precipitously drops.    I spent 30 minutes in the professional and overall care of this patient.      Padmini Gusman MD    "

## 2024-09-08 NOTE — PROGRESS NOTES
09/08/24 0742   Discharge Planning   Living Arrangements Spouse/significant other   Support Systems Spouse/significant other   Assistance Needed A&OX3; independent with ADLs with no DME currently; drives; room air baseline and currently room air   Type of Residence Private residence   Number of Stairs to Enter Residence 2   Number of Stairs Within Residence 26   Do you have animals or pets at home? No   Who is requesting discharge planning? Provider   Home or Post Acute Services None   Expected Discharge Disposition Home  (Pt very independent and not home bound. Dc dispo pending workup but likely home no needs)   Does the patient need discharge transport arranged? No   Financial Resource Strain   How hard is it for you to pay for the very basics like food, housing, medical care, and heating? Not hard   Housing Stability   In the last 12 months, was there a time when you were not able to pay the mortgage or rent on time? N   In the past 12 months, how many times have you moved where you were living? 1   At any time in the past 12 months, were you homeless or living in a shelter (including now)? N   Transportation Needs   In the past 12 months, has lack of transportation kept you from medical appointments or from getting medications? no   In the past 12 months, has lack of transportation kept you from meetings, work, or from getting things needed for daily living? No

## 2024-09-08 NOTE — PROGRESS NOTES
Physical Therapy    Physical Therapy Evaluation & Treatment    Patient Name: Claudette Whitelaw  MRN: [id number]  Today's Date: 9/8/2024   Time Calculation  Start Time: 1141  Stop Time: 1206  Time Calculation (min): 25 min    Assessment/Plan   PT Assessment  Rehab Prognosis: Good  Evaluation/Treatment Tolerance: Patient tolerated treatment well  Medical Staff Made Aware: Yes  Assessment Comment: Pt is an 83yo presenting after a fall in the community. Balance impairment with gait noted and pt encouraged to use a cane for mobiity to reduce risk for falls. Recommend LOW intensity skilled PT services.  End of Session Patient Position: Up in chair, Alarm off, not on at start of session   IP OR SWING BED PT PLAN  Inpatient or Swing Bed: Inpatient  PT Plan  PT Plan: Ongoing PT  PT Frequency: 2 times per week  PT Discharge Recommendations: Low intensity level of continued care  Equipment Recommended upon Discharge: Straight cane  PT - OK to Discharge: Yes (Per PT POC)      Subjective     General Visit Information:  General  Reason for Referral: fall in community  Past Medical History Relevant to Rehab: PMH:2 diabetes, hyperlipidemia, hypertension, CKD 3A  Prior to Session Communication: Bedside nurse  Patient Position Received: Bed, 3 rail up  Home Living:  Home Living  Type of Home: House  Lives With: Spouse  Home Adaptive Equipment: Cane, Walker rolling or standard  Home Layout: Two level  Home Access: Stairs to enter with rails  Entrance Stairs-Rails: Right  Entrance Stairs-Number of Steps: 1  Bathroom Shower/Tub: Walk-in shower (with grab bar and seat)  Home Living Comments:  (2nd floor bed/bath; 1/2 bath on first floor)  Prior Level of Function:  Prior Function Per Pt/Caregiver Report  Level of [place]: Independent with ADLs and functional transfers  Prior Function Comments:  ((+) driving; reports several falls)  Precautions:  Precautions  Medical Precautions: Fall precautions    Vital Signs (Past 2hrs)                 Objective   Pain:  Pain Assessment  0-10 (Numeric) Pain Score: 0 - No pain  Cognition:  Cognition  Attention:  (easily distractable)    General Assessments:                  Activity Tolerance  Endurance: Endurance does not limit participation in activity    Strength  Strength Comments: BLE WFL  Static Sitting Balance  Static Sitting-Comment/Number of Minutes: good/independent  Dynamic Sitting Balance  Dynamic Sitting-Comments: good/independent    Dynamic Standing Balance  Dynamic Standing-Comments: Lateral sway observed with dynamic activities. Recommend AD for additional support to reduce risk for further falls.  Functional Assessments:  Bed Mobility  Bed Mobility: Yes  Bed Mobility 1  Bed Mobility 1: Supine to sitting    Transfers  Transfer: Yes  Transfer 1  Transfer From 1: Sit to, Stand to  Transfer to 1: Sit, Stand  Technique 1: Sit to stand, Stand to sit    Ambulation/Gait Training  Ambulation/Gait Training Performed: Yes  Ambulation/Gait Training 1  Surface 1: Level tile  Device 1: No device  Assistance 1: Contact guard  Quality of Gait 1:  (lateral path deviation)  Comments/Distance (ft) 1: 200ft  Extremity/Trunk Assessments:  RLE   RLE : Within Functional Limits  LLE   LLE : Within Functional Limits  Treatments:  Ambulation/Gait Training  Ambulation/Gait Training Performed: Yes  Ambulation/Gait Training 1  Surface 1: Level tile  Device 1: No device  Assistance 1: Contact guard  Quality of Gait 1:  (lateral path deviation)  Comments/Distance (ft) 1: 200ft. Cues to increase attention to task to improve safety, however pt demonstrated difficulty with attaining. Recommend use of cane for mobility.  Outcome Measures:  [facility] Basic Mobility  Turning from your back to your side while in a flat bed without using bedrails: None  Moving from lying on your back to sitting on the side of a flat bed without using bedrails: None  Moving to and from bed to chair (including a wheelchair): None  Standing up from a chair  using your arms (e.g. wheelchair or bedside chair): None  To walk in hospital room: A little  Climbing 3-5 steps with railing: A little  Basic Mobility - Total Score: 22    Encounter Problems       Encounter Problems (Active)       Balance       STG - Maintains dynamic standing balance without upper extremity support x5min       Start:  09/08/24    Expected End:  09/22/24       INTERVENTIONS:  1. Practice standing with minimal support.  2. Educate patient about standing tolerance.  3. Educate patient about independence with gait, transfers, and ADL's.  4. Educate patient about use of assistive device.  5. Educate patient about self-directed care.            Mobility       STG - Patient will ambulate 250ft with cane without LOB       Start:  09/08/24    Expected End:  09/22/24            Pt will tolerate 20reps of standing exercises to improve functional strength and balance       Start:  09/08/24    Expected End:  09/22/24               Pain - Adult              Education Documentation  Precautions, taught by Shirlene Cross, PT at 9/8/2024  1:11 PM.  Learner: Patient  Readiness: Acceptance  Method: Explanation  Response: Verbalizes Understanding    Mobility Training, taught by Shirlene Cross, PT at 9/8/2024  1:11 PM.  Learner: Patient  Readiness: Acceptance  Method: Explanation  Response: Verbalizes Understanding    Education Comments  No comments found.

## 2024-09-08 NOTE — PROGRESS NOTES
Patient: Claudette Whitelaw  Room/bed: 220/220-A  Admitted on: 9/7/2024    Age: 84 y.o.   Gender: female  Code Status:  No Order   Admitting Dx: Fall, initial encounter [W19.XXXA]  Traumatic retroperitoneal hematoma, initial encounter [S36.892A]  Retroperitoneal hematoma [K68.3]    MRN: [id number]  PCP: Brenda Harris MD       Subjective   Feels a little better than yesterday.     Objective    Physical Exam  Constitutional:       Appearance: Normal appearance.   HENT:      Head: Normocephalic.      Comments: Tender subcutaneous edema on right mandible, jaw area.      Nose: Nose normal.      Mouth/Throat:      Mouth: Mucous membranes are moist.   Eyes:      Extraocular Movements: Extraocular movements intact.      Pupils: Pupils are equal, round, and reactive to light.   Cardiovascular:      Rate and Rhythm: Normal rate and regular rhythm.      Pulses: Normal pulses.   Pulmonary:      Effort: Pulmonary effort is normal.      Breath sounds: Normal breath sounds.   Abdominal:      Comments: Soft. Decreased but present bowel sounds.   Mild discomfort on right side to palpation.    Musculoskeletal:      Comments: Scattered ecchymoses on lower extremities  Tender over lower right paravertebral muscles.  No real cva tenderness   Skin:     General: Skin is warm and dry.   Neurological:      General: No focal deficit present.      Mental Status: She is alert. Mental status is at baseline.   Psychiatric:         Mood and Affect: Mood normal.         Behavior: Behavior normal.          Temp:  [36.1 °C (97 °F)-36.6 °C (97.9 °F)] 36.1 °C (97 °F)  Heart Rate:  [63-97] 73  Resp:  [14-18] 18  BP: (120-184)/() 128/81    Vitals:    09/08/24 0601   Weight: 59 kg (130 lb 1.1 oz)             I/Os    Intake/Output Summary (Last 24 hours) at 9/8/2024 0816  Last data filed at 9/7/2024 2008  Gross per 24 hour   Intake 240 ml   Output --   Net 240 ml       Labs:   Results from last 72 hours   Lab Units 09/07/24  1648   SODIUM mmol/L  "139   POTASSIUM mmol/L 4.2   CHLORIDE mmol/L 102   CO2 mmol/L 25   BUN mg/dL 34*   CREATININE mg/dL 1.31*   GLUCOSE mg/dL 154*   CALCIUM mg/dL 9.2   ANION GAP mmol/L 16   EGFR mL/min/1.73m*2 40*      Results from last 72 hours   Lab Units 09/07/24  1648   WBC AUTO x10*3/uL 9.4   HEMOGLOBIN g/dL 13.9   HEMATOCRIT % 43.0   PLATELETS AUTO x10*3/uL 188   NEUTROS PCT AUTO % 80.0   LYMPHS PCT AUTO % 10.9   MONOS PCT AUTO % 7.7   EOS PCT AUTO % 0.7      Lab Results   Component Value Date    CALCIUM 9.2 09/07/2024      No results found for: \"CRP\"   [unfilled]     Micro/ID:   No results found for the last 90 days.                   No lab exists for component: \"AGALPCRNB\"   .ID  No results found for: \"URINECULTURE\", \"BLOODCULT\", \"CSFCULTSMEAR\"    Images:  CT abdomen pelvis w IV contrast  Narrative: Interpreted By:  Luiz Bonilla,   STUDY:  CT ABDOMEN PELVIS W IV CONTRAST;  9/7/2024 6:03 pm      INDICATION:  Signs/Symptoms:Retroperitoneal hematoma displacing the right kidney  seen on CT scan of lumbar spine.          COMPARISON:  CT of the lumbar spine 09/07/2024. MRI abdomen 04/13/2018.      ACCESSION NUMBER(S):  [id number]      ORDERING CLINICIAN:  JEANA CROWELL      TECHNIQUE:  Contiguous axial images of the abdomen and pelvis were obtained after  the intravenous administration of 75 mL Omnipaque 350 contrast.  Coronal and sagittal reformatted images were reconstructed from the  axial data.      FINDINGS:  LOWER CHEST: No acute abnormality.      LIVER: Scattered subcentimeter hypodensities are noted throughout the  liver which are too small to fully characterize on this exam.  Follow-up MRI may be performed for further assessment as clinically  warranted.      BILE DUCTS: No significant intrahepatic or extrahepatic dilatation.      GALLBLADDER: No significant abnormality.      PANCREAS: 0.9 cm cystic lesion in the pancreatic body previously  characterized as probable IPMN on prior MRI.      SPLEEN: No significant " abnormality.      ADRENALS: No significant abnormality.      KIDNEYS, URETERS, BLADDER: Kidneys enhance symmetrically. No  hydronephrosis or appreciable hydroureter. The right ureter is  suboptimally visualized due to obscuration from adjacent right  retroperitoneal hematoma. Right kidney is also displaced laterally  due to a right retroperitoneal hematoma. This is discussed in greater  detail below. Similar appearance of the left interpolar renal mass  measuring 1.2 cm. This is better assessed on prior MRI. The bladder  is decompressed which limits assessment.      REPRODUCTIVE ORGANS: Hysterectomy.      GI: No obstruction. Colonic diverticulosis without evidence of acute  diverticulitis. The appendix is not visualized. No pericecal  inflammatory change or secondary signs of acute appendicitis.      VESSELS: No aortic aneurysm. Heavy aortic and iliac vascular  calcifications. The portal veins and IVC are patent. The IVC is  partially effaced by retroperitoneal hematoma.      PERITONEUM/RETROPERITONEUM: No intraperitoneal free air. Fat  stranding and retroperitoneal hematoma predominantly posteromedial to  the right kidney. There is a small contrast blush within the superior  aspect of the hematoma concerning for active extravasation. The  source of the hematoma is unclear as is the source vessel for the  focus of active extravasation. The hematoma is inseparable from the  psoas muscle along its superior margin possibly reflecting a psoas  hematoma which has extended beyond the confines of the muscle or  bleeding arising from a lumbar or renal vessel anterior to the psoas  muscle. No definite renal laceration is identified in the hematoma  appears to be distinct from the right renal parenchyma. It is also  unclear if the focus of extravasation arises from an arterial or  venous source.      LYMPH NODES: No enlarged lymph nodes.      ABDOMINAL WALL: No significant abnormality.      OSSEOUS STRUCTURES: No acute  osseous abnormality.      Impression: 1. Right retroperitoneal hematoma centered posteromedial to the right  kidney. There is lateral displacement of the right kidney. There is a  small focus of active extravasation within the superior aspect of the  hematoma. The source vessel for the focus of active extravasation is  unclear, possibly from a lumbar or renal vessel. It is also unclear  if this is arterial or venous in origin.  2. The source of the hematoma is unclear, however it appears to be  distinct from the renal parenchyma and there is no evidence of renal  parenchymal injury. Hematoma may be arising from a retroperitoneal  vessel or possibly from the right psoas muscle with extension beyond  the confines of the muscle. However, there is no abnormal expansion  of the right psoas muscle more inferiorly.  3. Please see additional findings and discussion as above.      MACRO:  Luiz Bonilla discussed the significance and urgency of this critical  finding by telephone with  JEANA CROWELL on 9/7/2024 at 6:07 pm.  (**-RCF-**) Findings:  See findings.      Signed by: Luiz Bonilla 9/7/2024 6:24 PM  Dictation workstation:   [id number]  CT lumbar spine wo IV contrast  Narrative: Interpreted By:  Becky Lindsay,   STUDY:  CT LUMBAR SPINE WO IV CONTRAST  9/7/2024 3:50 pm      INDICATION:  Signs/Symptoms:lower back pain after fall          COMPARISON:  None.      ACCESSION NUMBER(S):  [id number]      ORDERING CLINICIAN:  JEANA CROWELL      TECHNIQUE:  Contiguous axial CT images were obtained through the  lumbar spine  without contrast administration. Sagittal and coronal reconstructions  were generated.      FINDINGS:          ALIGNMENT:  Mild dextrocurvature centered near the L3-4 level. 5 mm of  anterolisthesis at L3-4.      VERTEBRAE/DISC SPACES:  No compression deformity or acute displaced fracture.  Lumbar  vertebral heights are preserved. Diffuse endplate spurring. L3-4  intervertebral disc space narrowing with  vacuum disc and vacuum  phenomenon in probable Schmorl's node in the inferior aspect of L3.  Trace anterolisthesis at L4-5. Bilateral L3-4 through L5-S1 facet  joint narrowing and spurring.      ADDITIONAL FINDINGS:  Partially imaged dense soft tissue swelling/hematoma medial to and  slightly displacing the right kidney measuring up to 4.7 cm in  thickness. Distal colon diverticulosis. Moderate aortoiliac  atherosclerotic calcifications. Subcentimeter rounded possible  vascular calcification in the falciform ligament.      Impression: No lumbar vertebral compression deformity or acute displaced  fracture. Multilevel degenerative changes with scoliosis and  spondylolisthesis.      Partially imaged right retroperitoneal hematoma displacing the right  kidney. Consider more complete evaluation using CT abdomen and pelvis.      MACRO:  None.      Signed by: Becky Lindsay 9/7/2024 4:09 PM  Dictation workstation:   [id number]  CT maxillofacial bones wo IV contrast, CT 3D reconstruction  Narrative: Interpreted By:  Becky Lindsay,   STUDY:  CT FACIAL BONES WO IV CONTRAST; CT 3D RECONSTRUCTION  9/7/2024 3:50 pm      INDICATION:  Signs/Symptoms:fall and right sided facial and jaw pain;  Signs/Symptoms:injury          COMPARISON:  None.      ACCESSION NUMBER(S):  [id number]; [id number]      ORDERING CLINICIAN:  JEANA CROWELL      TECHNIQUE:  Thin cut axial CT images through the facial bones were obtained and  reconstructed in the coronal  and sagittal plane. 3D reconstructions  were created on an independent workstation and provided for review.      FINDINGS:          No acute displaced facial bone fracture. The orbital walls are  intact. The globes and orbital contents are intact and symmetric.      No dislocation at the temporomandibular joints or displaced  mandibular fracture. Mild soft tissue swelling inferior to the right  mandibular body.      Visualized paranasal sinuses and mastoids are clear. Mild  right-sided  nasal septal deviation. Mild diffuse right nasal mucosal engorgement  likely reflecting physiologic variation. Unerupted left maxillary  molar.      Impression: No acute displaced facial bone fracture visualized. Mild soft tissue  swelling inferior to the right mandible.      MACRO:  None.          Signed by: Becky Lindsay 9/7/2024 4:04 PM  Dictation workstation:   [id number]  CT cervical spine wo IV contrast  Narrative: Interpreted By:  Becky Lindsay,   STUDY:  CT CERVICAL SPINE WO IV CONTRAST;  9/7/2024 3:50 pm      INDICATION:  Signs/Symptoms:fall and head injury.          COMPARISON:  None.      ACCESSION NUMBER(S):  [id number]      ORDERING CLINICIAN:  JEANA CROWELL      TECHNIQUE:  CT images were obtained through the cervical spine. Sagittal and  coronal reconstructions were generated.      FINDINGS:          ALIGNMENT:  3 mm anterolisthesis at C3-4. The midcervical lordosis is  straightened.      VERTEBRAE/DISC SPACES:  No compression deformity or acute displaced fracture.  Multilevel  degenerative changes including atlantoaxial joint space narrowing  with spurring and faint calcifications around the dens, diffuse  intervertebral disc space narrowing with endplate sclerosis and  spurring relatively sparing C2-3 and C6-7, and multilevel bilateral  facet joint narrowing and spurring. There is at least partial fusion  across the bilateral C4-5 facet joints.      ADDITIONAL FINDINGS:  No abnormal thickening of the prevertebral soft tissues.      Impression: No cervical vertebral compression deformity or acute displaced  fracture. Grade 1 anterolisthesis at C3-4. Multilevel  productive/degenerative changes.      MACRO:  None.      Signed by: Becky Lindsay 9/7/2024 4:01 PM  Dictation workstation:   [id number]  CT head wo IV contrast  Narrative: Interpreted By:  Becky Lindsay,   STUDY:  CT HEAD WO IV CONTRAST;  9/7/2024 3:50 pm      INDICATION:  Signs/Symptoms:fall and head injury.           COMPARISON:  None.      ACCESSION NUMBER(S):  [id number]      ORDERING CLINICIAN:  JEANA CROWELL      TECHNIQUE:  Unenhanced CT images of the head were obtained.      FINDINGS:  The ventricles, cisterns and sulci are prominent, consistent with  diffuse volume loss. There are areas of nonspecific white matter  hypodensity, which are probably age related or microvascular in  nature.   There is no acute intracranial hemorrhage, mass effect or  midline shift. No extraaxial fluid collection.      No acute displaced calvarial fracture.      Visualized paranasal sinuses are clear.      Impression: No acute intracranial hemorrhage or mass-effect.      MACRO:  None.      Signed by: Becky Lindsay 9/7/2024 3:58 PM  Dictation workstation:   [id number]  XR hand right 3+ views  Narrative: Interpreted By:  Popeye Null,   STUDY:  XR HAND RIGHT 3+ VIEWS  9/7/2024 3:32 pm      INDICATION:  Signs/Symptoms:right hand pain after fall      COMPARISON:  None.      ACCESSION NUMBER(S):  [id number]      ORDERING CLINICIAN:  JEANA CROWELL      TECHNIQUE:  Three views of the right hand including AP, oblique and lateral  projections were obtained.      FINDINGS:  There is no evidence of acute fracture or dislocation identified.  Moderate to severe hypertrophic degenerative changes are seen in 2nd  and distal interphalangeal joints and 4th proximal interphalangeal  joints. Mild-to-moderate hypertrophic degenerative changes are seen  throughout remaining interphalangeal joints. Moderate to severe  hypertrophic degenerative changes are seen trapezium 1st metacarpal      Impression: 1. No fracture or dislocation.  2. Degenerative changes, as described above.      MACRO:  None.      Signed by: Popeye Null 9/7/2024 3:45 PM  Dictation workstation:   [id number]  XR tibia fibula right 2 views  Narrative: Interpreted By:  Popeye Null,   STUDY:  XR TIBIA FIBULA RIGHT 2 VIEWS 9/7/2024 3:32 pm      INDICATION:  Signs/Symptoms:right tibia pain  and hematoma after fall      COMPARISON:  None.      ACCESSION NUMBER(S):  [id number]      ORDERING CLINICIAN:  JEANA CROWELL      TECHNIQUE:  3 views of the right tibia and fibula including AP and lateral  projections were obtained.      FINDINGS:  There is no evidence of acute fracture or dislocation identified.  Significant degenerative changes are seen in the midfoot.      Impression: 1. No fracture or dislocation.  2. Degenerative changes, as described above.      MACRO:  None.      Signed by: Popeye Null 9/7/2024 3:42 PM  Dictation workstation:   [id number]       Meds    Scheduled medications  acetaminophen, 975 mg, oral, q8h  amLODIPine, 5 mg, oral, Daily  influenza, 0.5 mL, intramuscular, During hospitalization  insulin lispro, 0-10 Units, subcutaneous, TID  lidocaine, 10 mL, subcutaneous, Once  pravastatin, 20 mg, oral, Nightly      Continuous medications     PRN medications  PRN medications: dextrose, dextrose, famotidine, glucagon, glucagon, labetaloL, melatonin, ondansetron, oxyCODONE, oxyCODONE, oxygen, sennosides-docusate sodium     Assessment and Plan    Right retroperitoneal hematoma, secondary to fall/trauma. Need to recheck hgb, request surgery consult. Pain appears to be controlled, but need to increase activity  Mechanical fall. No fractures  DM. Monitor, iss if needed  Hypertension, improved now that she is more comfortable  PT eval  CKD, stage 3  Possible dc later today or tomorrow if stable        Theresa Mcclellan MD

## 2024-09-09 VITALS
OXYGEN SATURATION: 94 % | HEIGHT: 58 IN | BODY MASS INDEX: 27.3 KG/M2 | DIASTOLIC BLOOD PRESSURE: 81 MMHG | WEIGHT: 130.07 LBS | RESPIRATION RATE: 16 BRPM | SYSTOLIC BLOOD PRESSURE: 136 MMHG | TEMPERATURE: 97 F | HEART RATE: 72 BPM
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
## 2024-09-09 PROBLEM — N18.30 STAGE 3 CHRONIC KIDNEY DISEASE (MULTI): Status: ACTIVE | Noted: 2024-09-09

## 2024-09-09 LAB
ANION GAP SERPL CALC-SCNC: 13 MMOL/L (ref 10–20)
BASOPHILS # BLD AUTO: 0.03 X10*3/UL (ref 0–0.1)
BASOPHILS NFR BLD AUTO: 0.5 %
BUN SERPL-MCNC: 34 MG/DL (ref 6–23)
CALCIUM SERPL-MCNC: 9 MG/DL (ref 8.6–10.3)
CHLORIDE SERPL-SCNC: 102 MMOL/L (ref 98–107)
CO2 SERPL-SCNC: 24 MMOL/L (ref 21–32)
CREAT SERPL-MCNC: 1.55 MG/DL (ref 0.5–1.05)
EGFRCR SERPLBLD CKD-EPI 2021: 32 ML/MIN/1.73M*2
EOSINOPHIL # BLD AUTO: 0.24 X10*3/UL (ref 0–0.4)
EOSINOPHIL NFR BLD AUTO: 3.8 %
ERYTHROCYTE [DISTWIDTH] IN BLOOD BY AUTOMATED COUNT: 13.2 % (ref 11.5–14.5)
GLUCOSE BLD MANUAL STRIP-MCNC: 118 MG/DL (ref 74–99)
GLUCOSE BLD MANUAL STRIP-MCNC: 124 MG/DL (ref 74–99)
GLUCOSE SERPL-MCNC: 133 MG/DL (ref 74–99)
HCT VFR BLD AUTO: 33.2 % (ref 36–46)
HCT VFR BLD AUTO: 33.9 % (ref 36–46)
HGB BLD-MCNC: 10.5 G/DL (ref 12–16)
HGB BLD-MCNC: 10.8 G/DL (ref 12–16)
IMM GRANULOCYTES # BLD AUTO: 0.04 X10*3/UL (ref 0–0.5)
IMM GRANULOCYTES NFR BLD AUTO: 0.6 % (ref 0–0.9)
LYMPHOCYTES # BLD AUTO: 1.12 X10*3/UL (ref 0.8–3)
LYMPHOCYTES NFR BLD AUTO: 17.5 %
MCH RBC QN AUTO: 29.8 PG (ref 26–34)
MCHC RBC AUTO-ENTMCNC: 31.6 G/DL (ref 32–36)
MCV RBC AUTO: 94 FL (ref 80–100)
MONOCYTES # BLD AUTO: 0.81 X10*3/UL (ref 0.05–0.8)
MONOCYTES NFR BLD AUTO: 12.7 %
NEUTROPHILS # BLD AUTO: 4.16 X10*3/UL (ref 1.6–5.5)
NEUTROPHILS NFR BLD AUTO: 64.9 %
NRBC BLD-RTO: 0 /100 WBCS (ref 0–0)
PLATELET # BLD AUTO: 162 X10*3/UL (ref 150–450)
POTASSIUM SERPL-SCNC: 4.1 MMOL/L (ref 3.5–5.3)
RBC # BLD AUTO: 3.52 X10*6/UL (ref 4–5.2)
SODIUM SERPL-SCNC: 135 MMOL/L (ref 136–145)
WBC # BLD AUTO: 6.4 X10*3/UL (ref 4.4–11.3)

## 2024-09-09 PROCEDURE — 85025 COMPLETE CBC W/AUTO DIFF WBC: CPT | Performed by: INTERNAL MEDICINE

## 2024-09-09 PROCEDURE — 85014 HEMATOCRIT: CPT | Performed by: PHYSICIAN ASSISTANT

## 2024-09-09 PROCEDURE — 36415 COLL VENOUS BLD VENIPUNCTURE: CPT | Performed by: PHYSICIAN ASSISTANT

## 2024-09-09 PROCEDURE — 97110 THERAPEUTIC EXERCISES: CPT | Mod: GP

## 2024-09-09 PROCEDURE — 82947 ASSAY GLUCOSE BLOOD QUANT: CPT

## 2024-09-09 PROCEDURE — 97116 GAIT TRAINING THERAPY: CPT | Mod: GP

## 2024-09-09 PROCEDURE — 2500000001 HC RX 250 WO HCPCS SELF ADMINISTERED DRUGS (ALT 637 FOR MEDICARE OP): Performed by: STUDENT IN AN ORGANIZED HEALTH CARE EDUCATION/TRAINING PROGRAM

## 2024-09-09 PROCEDURE — 80048 BASIC METABOLIC PNL TOTAL CA: CPT | Performed by: PHYSICIAN ASSISTANT

## 2024-09-09 PROCEDURE — G0378 HOSPITAL OBSERVATION PER HR: HCPCS

## 2024-09-09 PROCEDURE — 36415 COLL VENOUS BLD VENIPUNCTURE: CPT | Performed by: INTERNAL MEDICINE

## 2024-09-09 RX ORDER — METFORMIN HYDROCHLORIDE 500 MG/1
500 TABLET ORAL
Start: 2024-09-09

## 2024-09-09 RX ORDER — AMOXICILLIN 250 MG
2 CAPSULE ORAL NIGHTLY PRN
Qty: 14 TABLET | Refills: 0 | Status: SHIPPED | OUTPATIENT
Start: 2024-09-09

## 2024-09-09 RX ORDER — ACETAMINOPHEN 325 MG/1
650 TABLET ORAL EVERY 8 HOURS
Start: 2024-09-09

## 2024-09-09 ASSESSMENT — COGNITIVE AND FUNCTIONAL STATUS - GENERAL
CLIMB 3 TO 5 STEPS WITH RAILING: A LITTLE
WALKING IN HOSPITAL ROOM: A LITTLE
CLIMB 3 TO 5 STEPS WITH RAILING: A LITTLE
MOBILITY SCORE: 22
MOBILITY SCORE: 22
WALKING IN HOSPITAL ROOM: A LITTLE
EATING MEALS: A LITTLE
DAILY ACTIVITIY SCORE: 21
DRESSING REGULAR UPPER BODY CLOTHING: A LITTLE
DRESSING REGULAR LOWER BODY CLOTHING: A LITTLE

## 2024-09-09 ASSESSMENT — PAIN SCALES - GENERAL
PAINLEVEL_OUTOF10: 0 - NO PAIN
PAINLEVEL_OUTOF10: 0 - NO PAIN

## 2024-09-09 ASSESSMENT — PAIN - FUNCTIONAL ASSESSMENT: PAIN_FUNCTIONAL_ASSESSMENT: 0-10

## 2024-09-09 NOTE — PROGRESS NOTES
Physical Therapy    Physical Therapy Treatment    Patient Name: Claudette Whitelaw  MRN: [id number]  Today's Date: 9/9/2024  Time Calculation  Start Time: 1055  Stop Time: 1125  Time Calculation (min): 30 min         Assessment/Plan   PT Assessment  PT Assessment Results: Decreased mobility (deconditioning)  Rehab Prognosis: Good  Evaluation/Treatment Tolerance: Patient tolerated treatment well  Medical Staff Made Aware: Yes  Strengths: Ability to acquire knowledge  Barriers to Participation: Comorbidities  End of Session Communication: Bedside nurse  End of Session Patient Position: Bed, 3 rail up, Alarm on (Pt's tray table is in front of her for her to eat lunch. All of her essentials are in reach)  PT Plan  Inpatient/Swing Bed or Outpatient: Inpatient  PT Plan  Treatment/Interventions: Bed mobility, Transfer training, Gait training, Strengthening, Therapeutic exercise  PT Plan: Ongoing PT  PT Frequency: 2 times per week  PT Discharge Recommendations: Low intensity level of continued care  Equipment Recommended upon Discharge: Wheeled walker  PT - OK to Discharge: Yes (Per PT POC)      General Visit Information:   PT  Visit  PT Received On: 09/09/24  Response to Previous Treatment: Patient with no complaints from previous session.  General  Reason for Referral: 85 yo female admitted 2' to falling in the community  Referred By: Dr. HARRIET Mcclellan  Past Medical History Relevant to Rehab: PMH:2 diabetes, hyperlipidemia, hypertension, CKD 3A  Family/Caregiver Present: No  Prior to Session Communication: Bedside nurse  Patient Position Received: Bed, 3 rail up, Alarm on  General Comment:  (Pt is pleasant and agreeable to work with Pt. Continue to recommend LOW intensity follow up services)    Subjective   Precautions:       Vital Signs (Past 2hrs)                 Objective   Pain:  Pain Assessment  Pain Assessment: 0-10  0-10 (Numeric) Pain Score: 0 - No pain  Cognition:  Cognition  Overall Cognitive Status: Within  Functional Limits  Coordination:     Postural Control:  Static Sitting Balance  Static Sitting-Balance Support: Bilateral upper extremity supported, Feet supported  Static Sitting-Level of Assistance: Independent  Static Standing Balance  Static Standing-Balance Support: Bilateral upper extremity supported (wheeled walker)  Static Standing-Level of Assistance: Close supervision  Dynamic Standing Balance  Dynamic Standing-Balance Support: Bilateral upper extremity supported (wheeled walker)  Dynamic Standing-Level of Assistance: Close supervision  Extremity/Trunk Assessments:  RLE   RLE : Within Functional Limits  LLE   LLE : Within Functional Limits  Activity Tolerance:  Activity Tolerance  Endurance: Tolerates 10 - 20 min exercise with multiple rests  Treatments:  Therapeutic Exercise  Therapeutic Exercise Performed: Yes  Therapeutic Exercise Activity 1: Pt performed the following ex's; 6 sit<>stand functional strengthening trials. Sirring x's; B heel/toe raises, B LAQ, B hip flexion and B resisted hip abd/add each x 20 reps.    Bed Mobility  Bed Mobility: Yes  Bed Mobility 1  Bed Mobility 1: Supine to sitting, Sitting to supine  Level of Assistance 1: Close supervision    Ambulation/Gait Training  Ambulation/Gait Training Performed: Yes  Ambulation/Gait Training 1  Surface 1: Level tile  Device 1: Rolling walker  Assistance 1: Close supervision, Contact guard  Quality of Gait 1: Decreased step length (Decreased jacqui)  Comments/Distance (ft) 1:  (200 feet)  Transfers  Transfer: Yes  Transfer 1  Transfer From 1: Bed to  Transfer to 1: Stand  Technique 1: Sit to stand, Stand to sit  Transfer Device 1:  (wheeled walker)  Transfer Level of Assistance 1: Close supervision    Stairs  Stairs: No    Outcome Measures:  [facility] Basic Mobility  Turning from your back to your side while in a flat bed without using bedrails: None  Moving from lying on your back to sitting on the side of a flat bed without using bedrails:  None  Moving to and from bed to chair (including a wheelchair): None  Standing up from a chair using your arms (e.g. wheelchair or bedside chair): None  To walk in hospital room: A little  Climbing 3-5 steps with railing: A little  Basic Mobility - Total Score: 22    Education Documentation  No documentation found.  Education Comments  No comments found.        OP EDUCATION:       Encounter Problems       Encounter Problems (Active)       Balance       STG - Maintains dynamic standing balance without upper extremity support x5min (Progressing)       Start:  09/08/24    Expected End:  09/22/24       INTERVENTIONS:  1. Practice standing with minimal support.  2. Educate patient about standing tolerance.  3. Educate patient about independence with gait, transfers, and ADL's.  4. Educate patient about use of assistive device.  5. Educate patient about self-directed care.            Mobility       STG - Patient will ambulate 250ft with cane without LOB (Progressing)       Start:  09/08/24    Expected End:  09/22/24            Pt will tolerate 20reps of standing exercises to improve functional strength and balance (Progressing)       Start:  09/08/24    Expected End:  09/22/24               Pain - Adult

## 2024-09-09 NOTE — CARE PLAN
Problem: Skin  Goal: Decreased wound size/increased tissue granulation at next dressing change  Outcome: Progressing  Goal: Participates in plan/prevention/treatment measures  Outcome: Progressing  Goal: Prevent/manage excess moisture  Outcome: Progressing  Goal: Prevent/minimize sheer/friction injuries  Outcome: Progressing  Goal: Promote/optimize nutrition  Outcome: Progressing  Goal: Promote skin healing  Outcome: Progressing   The patient's goals for the shift include      The clinical goals for the shift include pt with be fall and pain free

## 2024-09-09 NOTE — CARE PLAN
The patient's goals for the shift include      The clinical goals for the shift include pt with be fall and pain free      Problem: Pain - Adult  Goal: Verbalizes/displays adequate comfort level or baseline comfort level  Outcome: Met     Problem: Safety - Adult  Goal: Free from fall injury  Outcome: Met     Problem: Discharge Planning  Goal: Discharge to home or other facility with appropriate resources  Outcome: Met     Problem: Fall/Injury  Goal: Not fall by end of shift  Outcome: Met  Goal: Be free from injury by end of the shift  Outcome: Met  Goal: Verbalize understanding of personal risk factors for fall in the hospital  Outcome: Met  Goal: Verbalize understanding of risk factor reduction measures to prevent injury from fall in the home  Outcome: Met  Goal: Use assistive devices by end of the shift  Outcome: Met  Goal: Pace activities to prevent fatigue by end of the shift  Outcome: Met     Problem: Pain  Goal: Takes deep breaths with improved pain control throughout the shift  Outcome: Met  Goal: Turns in bed with improved pain control throughout the shift  Outcome: Met  Goal: Walks with improved pain control throughout the shift  Outcome: Met  Goal: Performs ADL's with improved pain control throughout shift  Outcome: Met  Goal: Participates in PT with improved pain control throughout the shift  Outcome: Met  Goal: Free from opioid side effects throughout the shift  Outcome: Met  Goal: Free from acute confusion related to pain meds throughout the shift  Outcome: Met

## 2024-09-09 NOTE — DISCHARGE SUMMARY
Discharge Diagnosis  Mechanical fall, right hand laceration, retroperitoneal hematoma, drop in Hgb, CKD    Issues Requiring Follow-Up  PCP in 1 week to recheck labs  -Outpt PT  Hold metformin until labs rechecked  Monitor BP, unsure of medication compliance of norvasc    Discharge Meds     Medication List      START taking these medications     acetaminophen 325 mg tablet; Commonly known as: Tylenol; Take 2 tablets   (650 mg) by mouth every 8 hours.   sennosides-docusate sodium 8.6-50 mg tablet; Commonly known as:   Betty-Colace; Take 2 tablets by mouth as needed at bedtime for   constipation.     CHANGE how you take these medications     metFORMIN 500 mg tablet; Commonly known as: Glucophage; Take 1 tablet   (500 mg) by mouth once daily with breakfast. Do not take this medication   as of 9/9/24 until labs are rechecked with PCP in 1 week; What changed:   additional instructions     CONTINUE taking these medications     amLODIPine 2.5 mg tablet; Commonly known as: Norvasc   calcitriol 0.25 mcg capsule; Commonly known as: Rocaltrol   ferrous sulfate (325 mg ferrous sulfate) tablet   magnesium (amino acid chelate) 133 mg tablet   pravastatin 20 mg tablet; Commonly known as: Pravachol       Test Results Pending At Discharge  Pending Labs       No current pending labs.            Hospital Course   From [facility]:  Claudette Whitelaw is a 84 y.o. female with type 2 diabetes, hyperlipidemia, hypertension, CKD 3A.  She presented to the [facility] ED after she tripped and fell on a sidewalk, sustaining a laceration to her right hand.  Also had back pain.  Workup in the ED revealed a right sided retroperitoneal hematoma with active extravasation.  Not on blood thinners.  Case was discussed with on-call trauma and IR, who recommended observation but no interventions at this time.     Right hand laceration glued in the ED.  Endorses taking Tylenol and ibuprofen for pain.  Per outpatient notes, is prescribed 2.5 mg of amlodipine but  not on the fill history.  Blood pressure was elevated to 180 systolic.  Reports that she usually better controlled at home.  Having pain in the right lower back.  Evaluated by trauma who recommended serial H/H,  no need for repeat imaging unless she decompensates. She was stable throughout stay, PT recommended low intensity therapy and she would like to attend outpt PT with her .  On day of discharge, patient denied CP, SOB, n/v/diarrhea/constipation, was tolerating diet and ambulating with walker and stand by assist without issue.  Patient appeared hemodynamically stable at time of discharge. Discussed with attending physician who agreed with discharge plan.  Time spent on discharge including patient evaluation, education, coordination of care with consultants, attending physician, care coordination and nursing was 35 minutes.        Pertinent Physical Exam At Time of Discharge  Physical Exam  Physical Exam  Gen: NAD  Eyes:  EOM intact  ENT: MMM  Neck: No JVD  Respiratory: CTAB, no wheezes/rhonchi  Cardiac: RRR, no murmurs rubs or gallops  Abdomen: soft, NT, +BS  Extremities: no edema or cyanosis, right hand superficial lac at base of right thumb  MSK: no obvious bruising on back, non tender  Neuro: No focal deficits, alert and oriented x 3  Psych:  appropriate mood and behavior    Outpatient Follow-Up  No future appointments.      Fozia Emanuel PA-C

## 2025-04-03 ENCOUNTER — APPOINTMENT (OUTPATIENT)
Dept: ORTHOPEDIC SURGERY | Facility: HOSPITAL | Age: 85
End: 2025-04-03
Payer: MEDICARE

## 2025-04-10 ENCOUNTER — APPOINTMENT (OUTPATIENT)
Dept: ORTHOPEDIC SURGERY | Facility: HOSPITAL | Age: 85
End: 2025-04-10
Payer: MEDICARE